# Patient Record
Sex: MALE | Race: AMERICAN INDIAN OR ALASKA NATIVE
[De-identification: names, ages, dates, MRNs, and addresses within clinical notes are randomized per-mention and may not be internally consistent; named-entity substitution may affect disease eponyms.]

---

## 2019-12-18 ENCOUNTER — HOSPITAL ENCOUNTER (EMERGENCY)
Dept: HOSPITAL 5 - ED | Age: 49
Discharge: HOME | End: 2019-12-18
Payer: MEDICARE

## 2019-12-18 VITALS — SYSTOLIC BLOOD PRESSURE: 134 MMHG | DIASTOLIC BLOOD PRESSURE: 82 MMHG

## 2019-12-18 DIAGNOSIS — G89.29: ICD-10-CM

## 2019-12-18 DIAGNOSIS — K02.9: ICD-10-CM

## 2019-12-18 DIAGNOSIS — Z98.890: ICD-10-CM

## 2019-12-18 DIAGNOSIS — K52.9: Primary | ICD-10-CM

## 2019-12-18 NOTE — EMERGENCY DEPARTMENT REPORT
ED N/V/D HPI





- General


Chief complaint: Dental/Oral


Stated complaint: VOMITING/INFECTION TOOTHACHE


Time Seen by Provider: 12/18/19 09:16


Source: patient


Mode of arrival: Ambulatory


Limitations: No Limitations





- History of Present Illness


Initial comments: 


39-year-old male with a past medical history of hypertension presents to the ER 

today complaining of nausea, vomiting, diarrhea, abdominal pain and dental pain.

 The patient states any symptoms of vomiting, diarrhea and abdominal pain 

started yesterday.  Patient states that he has vomited about 4 times since 

yesterday.  He reports 3 episodes of watery stools yesterday.  He states that he

is pain is mainly lower abdomen. He denies any hematemesis, melana or 

hematoschezia. He states he has been around his niece who has had similar 

symptoms. He denies any fever or chills.  He states he has been having left 

upper dental pain for couple days. He has known broken tooth in that area. He 

reports swelling and concerned he is developing an infection in tooth. 


MD complaint: nausea, vomiting, diarrhea, abdominal pain, other (Dental Pain )





- Related Data


                                  Previous Rx's











 Medication  Instructions  Recorded  Last Taken  Type


 


Dicyclomine [Bentyl] 20 mg PO QID PRN #40 tablet 12/18/19 Unknown Rx


 


Ketorolac [Toradol] 10 mg PO Q6H PRN #30 tablet 12/18/19 Unknown Rx


 


Ondansetron [Zofran Odt] 4 mg PO Q8HR PRN #15 tab.rapdis 12/18/19 Unknown Rx











                                    Allergies











Allergy/AdvReac Type Severity Reaction Status Date / Time


 


No Known Allergies Allergy   Unverified 12/18/19 08:32














ED Review of Systems


ROS: 


Stated complaint: VOMITING/INFECTION TOOTHACHE


Other details as noted in HPI





Comment: All other systems reviewed and negative


Constitutional: denies: chills, fever


ENT: dental pain


Respiratory: denies: cough, shortness of breath, SOB with exertion, SOB at rest,

wheezing


Cardiovascular: denies: chest pain


Gastrointestinal: abdominal pain, nausea, vomiting, diarrhea





ED Past Medical Hx





- Past Medical History


Previous Medical History?: Yes


Additional medical history: Chronic back pain





- Surgical History


Past Surgical History?: No





- Social History


Smoking Status: Never Smoker


Substance Use Type: None





- Medications


Home Medications: 


                                Home Medications











 Medication  Instructions  Recorded  Confirmed  Last Taken  Type


 


Dicyclomine [Bentyl] 20 mg PO QID PRN #40 tablet 12/18/19  Unknown Rx


 


Ketorolac [Toradol] 10 mg PO Q6H PRN #30 tablet 12/18/19  Unknown Rx


 


Ondansetron [Zofran Odt] 4 mg PO Q8HR PRN #15 tab.rapdis 12/18/19  Unknown Rx














ED Physical Exam





- General


Limitations: No Limitations


General appearance: alert, in no apparent distress





- Head


Head exam: Present: atraumatic, normocephalic





- Eye


Eye exam: Present: normal appearance, PERRL, EOMI





- ENT


ENT exam: Present: normal exam, mucous membranes moist





- Expanded ENT Exam


  ** Expanded


Teeth exam: Present: dental caries, dental tenderness #, other





                            __________________________














                            __________________________





 1 - Dental Tenderness, Other (severe decay down to gum/no apparent abscess or 

facial cellulitis)








- Neck


Neck exam: Present: normal inspection, full ROM.  Absent: tenderness, 

meningismus





- Respiratory


Respiratory exam: Absent: respiratory distress





- Cardiovascular


Cardiovascular Exam: Present: regular rate





- GI/Abdominal


GI/Abdominal exam: Present: soft.  Absent: distended, tenderness, guarding, 

rebound





- Neurological Exam


Neurological exam: Present: alert, oriented X3, CN II-XII intact, normal gait





ED Course


                                   Vital Signs











  12/18/19 12/18/19 12/18/19





  08:30 10:51 11:15


 


Temperature 98.1 F  


 


Pulse Rate 83  


 


Respiratory 18 18 16





Rate   


 


Blood Pressure 134/82  


 


O2 Sat by Pulse 99  





Oximetry   














ED Medical Decision Making





- Medical Decision Making


Patient presents to the ER today complaining of vomiting, nausea and diarrhea 

and abdominal pain since yesterday.  He also complains of left upper dental pain

 and is concerned he may be developing an infection.  He has been around family 

members sick with a stomach virus.  Clinically patient appears well, he is not 

toxic or ill appearing, does not appear to be in any acute distress.  He has a 

soft nontender abdomen.  He appears well hydrated. He is neurologically intact. 

VS stable. Dental exam showed dental decay to his left upper molar but no signs 

of abscess, or facial cellulitis.  Suspect viral gastroenteritis at this time.  

Workup is not indicated this time.  Patient will be given medication to help 

with his vomiting, and diarrhea and abdominal cramps.  Recommend the patient 

drink lots of fluids.  Patient I do not see any signs of a dental infection or 

abscess at this time, and is concerned that if I give him any antibiotics this 

could worsen his diarrhea.  Instead I recommend that he follows up with a de

ntist for further evaluation of his dental pain. 





Critical care attestation.: 


If time is entered above; I have spent that time in minutes in the direct care 

of this critically ill patient, excluding procedure time.








ED Disposition


Clinical Impression: 


 Gastroenteritis, Pain due to dental caries





Disposition: DC-01 TO HOME OR SELFCARE


Is pt being admited?: No


Does the pt Need Aspirin: No


Condition: Stable


Instructions:  Dental Caries (ED), Gastroenteritis (ED)


Additional Instructions: 


Recommend lots of fluids. Take medications as prescribed. Follow up with Dental 

clinics as discussed. Recommend warm salt water rinses. Return to ED if symptoms

worsen and if you start developing any fever (temp 100.5 or higher. 


Prescriptions: 


Dicyclomine [Bentyl] 20 mg PO QID PRN #40 tablet


 PRN Reason: Loose Stool


Ketorolac [Toradol] 10 mg PO Q6H PRN #30 tablet


 PRN Reason: Pain


Ondansetron [Zofran Odt] 4 mg PO Q8HR PRN #15 tab.rapdis


 PRN Reason: Vomiting


Referrals: 


PRIMARY CARE,MD [Primary Care Provider] - 3-5 Days


Time of Disposition: 11:01